# Patient Record
Sex: MALE | Race: WHITE | NOT HISPANIC OR LATINO | URBAN - METROPOLITAN AREA
[De-identification: names, ages, dates, MRNs, and addresses within clinical notes are randomized per-mention and may not be internally consistent; named-entity substitution may affect disease eponyms.]

---

## 2019-05-21 ENCOUNTER — EMERGENCY (EMERGENCY)
Facility: HOSPITAL | Age: 19
LOS: 1 days | Discharge: HOME | End: 2019-05-21
Attending: EMERGENCY MEDICINE | Admitting: EMERGENCY MEDICINE
Payer: COMMERCIAL

## 2019-05-21 VITALS
SYSTOLIC BLOOD PRESSURE: 121 MMHG | RESPIRATION RATE: 16 BRPM | DIASTOLIC BLOOD PRESSURE: 74 MMHG | OXYGEN SATURATION: 98 %

## 2019-05-21 VITALS
HEIGHT: 70 IN | SYSTOLIC BLOOD PRESSURE: 143 MMHG | WEIGHT: 149.91 LBS | HEART RATE: 86 BPM | RESPIRATION RATE: 16 BRPM | DIASTOLIC BLOOD PRESSURE: 96 MMHG | OXYGEN SATURATION: 96 % | TEMPERATURE: 98 F

## 2019-05-21 PROCEDURE — 90471 IMMUNIZATION ADMIN: CPT

## 2019-05-21 PROCEDURE — 90715 TDAP VACCINE 7 YRS/> IM: CPT

## 2019-05-21 PROCEDURE — 99283 EMERGENCY DEPT VISIT LOW MDM: CPT | Mod: 25

## 2019-05-21 PROCEDURE — 99283 EMERGENCY DEPT VISIT LOW MDM: CPT

## 2019-05-21 PROCEDURE — 12002 RPR S/N/AX/GEN/TRNK2.6-7.5CM: CPT

## 2019-05-21 RX ORDER — TETANUS TOXOID, REDUCED DIPHTHERIA TOXOID AND ACELLULAR PERTUSSIS VACCINE, ADSORBED 5; 2.5; 8; 8; 2.5 [IU]/.5ML; [IU]/.5ML; UG/.5ML; UG/.5ML; UG/.5ML
0.5 SUSPENSION INTRAMUSCULAR ONCE
Refills: 0 | Status: COMPLETED | OUTPATIENT
Start: 2019-05-21 | End: 2019-05-21

## 2019-05-21 RX ADMIN — TETANUS TOXOID, REDUCED DIPHTHERIA TOXOID AND ACELLULAR PERTUSSIS VACCINE, ADSORBED 0.5 MILLILITER(S): 5; 2.5; 8; 8; 2.5 SUSPENSION INTRAMUSCULAR at 10:35

## 2019-05-21 NOTE — ED PROVIDER NOTE - ATTENDING CONTRIBUTION TO CARE
19 yo male co lac to left knee from a fall. Multiple linear lacs left knee totalling 10cm. Other superficial lacs not suturable. Plan sutures, bacitracin dressing. Suture removal in 7-10 days.    I performed a history and physical exam of the patient and discussed their management with the advanced care provider. I reviewed the advanced care provider's note and agree with the documented findings and plan of care. My medical decision making and objective findings are found above.

## 2019-05-21 NOTE — ED ADULT NURSE NOTE - OBJECTIVE STATEMENT
accidently cut self with chain saw while cutting tree stump. Multiple laceration noted to left knee, abrasion to right shin. no active bleeding noted. good rom, skin color good.

## 2019-05-21 NOTE — ED PROVIDER NOTE - OBJECTIVE STATEMENT
17 y/o M presents with c/o laceration to L lower leg x 40 mins. Pt states that he was using a chain saw to cut a tree stump down and accidentally sustained injury to his L lower leg.  Pt does not recall his last tetanus. Denies numbness, tingling, FB or other symptoms/injuries.

## 2019-05-21 NOTE — ED PROVIDER NOTE - CLINICAL SUMMARY MEDICAL DECISION MAKING FREE TEXT BOX
19 y/o M with co L lower leg laceration sp chain saw struck his leg while trying to cut a tree stump down at work today, will update tetanus, multiple abrasions noted with 2 parallel lacerations requiring sutures, will apply sutures, dc home with wound care instructions.

## 2019-05-21 NOTE — ED PROVIDER NOTE - SKIN WOUND TYPE
ABRASION(S)/LACERATION(S)/+additional multiple abrasions noted parallel to lacerations and slightly distal to laceration

## 2019-05-21 NOTE — ED PROCEDURE NOTE - PROCEDURE ADDITIONAL DETAILS
6 sutures placed into a 3cm intermediate laceration and 4 sutures placed into a 2cm laceration parallel to the first laceration, total of 10 sutures placed, pt tolerated well, NVI, bacitracin applied and covered with sterile dressing.

## 2019-05-21 NOTE — ED ADULT NURSE NOTE - NSIMPLEMENTINTERV_GEN_ALL_ED
Implemented All Universal Safety Interventions:  [place] to call system. Call bell, personal items and telephone within reach. Instruct patient to call for assistance. Room bathroom lighting operational. Non-slip footwear when patient is off stretcher. Physically safe environment: no spills, clutter or unnecessary equipment. Stretcher in lowest position, wheels locked, appropriate side rails in place.

## 2019-05-21 NOTE — ED PROVIDER NOTE - PROGRESS NOTE DETAILS
Pt examined by ED attending, Dr. Murcia who agreed with disposition and plan. Laceration repaired and pt tolerated well, NVI, will d/c home with wound care instructions, f/u pmd.

## 2023-05-03 NOTE — ED ADULT TRIAGE NOTE - AS O2 DELIVERY
room air Clindamycin Counseling: I counseled the patient regarding use of clindamycin as an antibiotic for prophylactic and/or therapeutic purposes. Clindamycin is active against numerous classes of bacteria, including skin bacteria. Side effects may include nausea, diarrhea, gastrointestinal upset, rash, hives, yeast infections, and in rare cases, colitis.

## 2024-03-04 NOTE — ED PROCEDURE NOTE - CPROC ED TIME OUT STATEMENT1
Awake/Alert “Patient's name, , procedure and correct site were confirmed during the [place] Timeout.”